# Patient Record
Sex: MALE | Race: WHITE | NOT HISPANIC OR LATINO | Employment: FULL TIME | URBAN - METROPOLITAN AREA
[De-identification: names, ages, dates, MRNs, and addresses within clinical notes are randomized per-mention and may not be internally consistent; named-entity substitution may affect disease eponyms.]

---

## 2017-01-03 RX ORDER — NADOLOL 20 MG/1
TABLET ORAL
Qty: 180 TABLET | Refills: 2 | Status: SHIPPED | OUTPATIENT
Start: 2017-01-03 | End: 2019-07-05 | Stop reason: SDUPTHER

## 2017-11-01 ENCOUNTER — OFFICE VISIT (OUTPATIENT)
Dept: CARDIOLOGY | Facility: CLINIC | Age: 52
End: 2017-11-01

## 2017-11-01 VITALS
SYSTOLIC BLOOD PRESSURE: 126 MMHG | WEIGHT: 187.2 LBS | BODY MASS INDEX: 27.73 KG/M2 | HEIGHT: 69 IN | DIASTOLIC BLOOD PRESSURE: 78 MMHG | HEART RATE: 68 BPM

## 2017-11-01 DIAGNOSIS — I34.1 MITRAL VALVE PROLAPSE SYNDROME: Primary | ICD-10-CM

## 2017-11-01 DIAGNOSIS — G93.32 CFS (CHRONIC FATIGUE SYNDROME): ICD-10-CM

## 2017-11-01 DIAGNOSIS — E78.5 ATHEROGENIC DYSLIPIDEMIA: ICD-10-CM

## 2017-11-01 PROCEDURE — 99214 OFFICE O/P EST MOD 30 MIN: CPT | Performed by: INTERNAL MEDICINE

## 2017-11-01 PROCEDURE — 90471 IMMUNIZATION ADMIN: CPT | Performed by: INTERNAL MEDICINE

## 2017-11-01 PROCEDURE — 90686 IIV4 VACC NO PRSV 0.5 ML IM: CPT | Performed by: INTERNAL MEDICINE

## 2017-11-01 RX ORDER — GEMFIBROZIL 600 MG/1
TABLET, FILM COATED ORAL
COMMUNITY
Start: 2017-10-30 | End: 2018-11-02
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
## 2018-01-12 RX ORDER — NADOLOL 20 MG/1
TABLET ORAL
Qty: 180 TABLET | Refills: 2 | Status: SHIPPED | OUTPATIENT
Start: 2018-01-12 | End: 2018-11-01 | Stop reason: SDUPTHER

## 2018-11-02 ENCOUNTER — OFFICE VISIT (OUTPATIENT)
Dept: CARDIOLOGY | Facility: CLINIC | Age: 53
End: 2018-11-02

## 2018-11-02 VITALS
DIASTOLIC BLOOD PRESSURE: 92 MMHG | SYSTOLIC BLOOD PRESSURE: 132 MMHG | HEART RATE: 59 BPM | BODY MASS INDEX: 27.28 KG/M2 | WEIGHT: 184.2 LBS | HEIGHT: 69 IN

## 2018-11-02 DIAGNOSIS — E78.5 ATHEROGENIC DYSLIPIDEMIA: ICD-10-CM

## 2018-11-02 DIAGNOSIS — I34.1 MITRAL VALVE PROLAPSE SYNDROME: Primary | ICD-10-CM

## 2018-11-02 PROCEDURE — 99213 OFFICE O/P EST LOW 20 MIN: CPT | Performed by: INTERNAL MEDICINE

## 2018-11-02 NOTE — PROGRESS NOTES
"    Subjective:     Encounter Date:11/02/2018    Patient ID: Yoni Conway is a 53 y.o.  white male, , from [place], [place].      PHYSICIAN: Cleopatra Sanchez MD.    Chief Complaint:   Chief Complaint   Patient presents with   • Mitral Valve Prolapse     Problem List:  1. Mitral valve prolapse syndrome:  a. Pectus excavatum with straight back syndrome.   b. Apical systolic murmur with holosystolic echocardiographic mitral valve prolapse.   c. Incomplete right bundle branch block.   d. Remote noncardiac atypical chest pain, resolved.   e. Chronic fatigue/dyspnea.   f. Acceptable echocardiographic left ventricular function, 2003.   g. Remote intermittent palpitations with overall acceptable 24 hour Holter monitor, October 2003, with acceptable event recorder, autumn 2003.   h. Abnormal acceptable combination Doppler echocardiogram with residual CCS Class I angina pectoris/CHF, August 2006.   i. Abnormal combination Doppler echocardiogram for suspected endocarditis ([facility]), August 2009.   j. Residual class I symptoms with abnormal stable combination Doppler echocardiogram/EKG, February 2011, with stable echocardiogram, November 2012.   k. Residual class I symptoms.  2. Mild-moderate situational depression.   3. Chronic fatigue syndrome -- (?) etiology.   4. Remote apparent otitis media/\"blood infection\", 1979.   5. Remote anal fistula resection/repair, 2002.   6. Atherogenic dyslipidemia; ASCVD 10 year risk is 5.5%, 2.4% if treated.     No Known Allergies      Current Outpatient Prescriptions:   •  FLUoxetine (PROzac) 20 MG capsule, 3 (Three) Times a Day., Disp: , Rfl:   •  nadolol (CORGARD) 20 MG tablet, 1 tab po every 12 hours, Disp: 180 tablet, Rfl: 2  •  QUEtiapine (SEROquel) 50 MG tablet, Daily., Disp: , Rfl:     HISTORY OF PRESENT ILLNESS:  Patient is here for a 1 year follow-up.  He denies any palpitations, presyncope, syncope, chest pain, or shortness of breath.  He is still working full " "time and does his work without difficulties.  He frequently goes fishing and hunts on the weekends.  He has already had his influenza vaccination this year.  He has not had any new diagnoses, hospitalizations, or surgeries since he was last here.  Patient otherwise denies chest pain, shortness of breath, PND, edema, palpitations, syncope or presyncope at this time.      Review of Systems   All other systems reviewed and are negative.     Obtained and otherwise negative except as outlined in problem list and HPI.    Procedures       Objective:       Vitals:    11/02/18 1249 11/02/18 1251   BP: 131/88 132/92   BP Location: Left arm Left arm   Patient Position: Sitting Standing   Pulse: 58 59   Weight: 83.6 kg (184 lb 3.2 oz)    Height: 175.3 cm (69\")      Body mass index is 27.2 kg/m².  Last weight November 2017 was 187 pounds    Physical Exam   Constitutional: He is oriented to person, place, and time. He appears well-developed and well-nourished.   HENT:   Mouth/Throat: Uvula is midline, oropharynx is clear and moist and mucous membranes are normal. He does not have dentures. No oral lesions. Normal dentition. No dental abscesses, uvula swelling, lacerations or dental caries.   Neck: No JVD present. Carotid bruit is not present. No thyromegaly present.   Cardiovascular: Regular rhythm, S1 normal and S2 normal.  Exam reveals no gallop, no S3 and no friction rub.    Murmur heard.   Medium-pitched early systolic murmur is present with a grade of 1/6  at the upper left sternal border  Pulses:       Dorsalis pedis pulses are 2+ on the right side, and 2+ on the left side.        Posterior tibial pulses are 2+ on the right side, and 2+ on the left side.   Pulmonary/Chest: Effort normal and breath sounds normal. He has no wheezes. He has no rhonchi. He has no rales.   Abdominal: Soft. He exhibits no mass. There is no hepatosplenomegaly. There is no tenderness. There is no guarding.   Bowel sounds audible x4 "   Musculoskeletal: Normal range of motion. He exhibits no edema.   Lymphadenopathy:     He has no cervical adenopathy.   Neurological: He is alert and oriented to person, place, and time.   Skin: Skin is warm, dry and intact. No rash noted.   Vitals reviewed.        Lab Review:   3/7/18: (Reviewed per physician letter):  · Lipid panel: Cholesterol 170, triglycerides 130, HDL 34,   · CMP: Normal electrolytes, creatinine 1.2, BUN 12, glucose 107, normal serum calcium and liver function tests, albumin 4.2      Assessment:   Overall continued acceptable course with no interim cardiopulmonary complaints with good functional status. We will defer additional diagnostic or therapeutic intervention from a cardiac perspective at this time.  His ASCVD 10 year risk is 5.5%, 2.4% if treated so we will defer statin therapy.     Diagnosis Plan   1. Mitral valve prolapse syndrome  Stable; No recurrent angina pectoris or CHF; continue current treatment     2. Atherogenic dyslipidemia  Acceptable          Plan:         1. Patient to continue current medications and close follow up with the above providers.  2. Tentative cardiology follow up in November 2019, or patient may return sooner PRN.    Scribed for Rashard Tate MD by Diann Daigle, [place]. 11/2/2018  1:05 PM    I, Rashard Tate MD, [facility], personally performed the services described in this documentation as scribed by the above named individual in my presence, and it is both accurate and complete. At 1:38 PM on 11/02/2018

## 2019-07-05 RX ORDER — NADOLOL 20 MG/1
TABLET ORAL
Qty: 180 TABLET | Refills: 3 | Status: SHIPPED | OUTPATIENT
Start: 2019-07-05 | End: 2019-11-08 | Stop reason: SDUPTHER

## 2019-11-08 ENCOUNTER — OFFICE VISIT (OUTPATIENT)
Dept: CARDIOLOGY | Facility: CLINIC | Age: 54
End: 2019-11-08

## 2019-11-08 VITALS
SYSTOLIC BLOOD PRESSURE: 134 MMHG | WEIGHT: 180 LBS | DIASTOLIC BLOOD PRESSURE: 75 MMHG | HEART RATE: 55 BPM | HEIGHT: 69 IN | BODY MASS INDEX: 26.66 KG/M2

## 2019-11-08 DIAGNOSIS — I34.1 MITRAL VALVE PROLAPSE SYNDROME: Primary | ICD-10-CM

## 2019-11-08 DIAGNOSIS — E78.5 ATHEROGENIC DYSLIPIDEMIA: ICD-10-CM

## 2019-11-08 PROCEDURE — 99213 OFFICE O/P EST LOW 20 MIN: CPT | Performed by: INTERNAL MEDICINE

## 2019-11-08 RX ORDER — NADOLOL 20 MG/1
TABLET ORAL
Qty: 90 TABLET | Refills: 3 | Status: SHIPPED | OUTPATIENT
Start: 2019-11-08 | End: 2020-07-13

## 2019-11-08 NOTE — PROGRESS NOTES
"    Subjective:     Encounter Date:11/08/2019    Patient ID: Yoni Conway is a 54 y.o.  white male, , from [place], [place].      PHYSICIAN: Cleopatra Sanchez MD    Chief Complaint:   Chief Complaint   Patient presents with   • Mitral Valve Prolapse     Problem List:  1. Mitral valve prolapse syndrome:  a. Pectus excavatum with straight back syndrome.   b. Apical systolic murmur with holosystolic echocardiographic mitral valve prolapse.   c. Incomplete right bundle branch block.   d. Remote noncardiac atypical chest pain, resolved.   e. Chronic fatigue/dyspnea.   f. Acceptable echocardiographic left ventricular function, 2003.   g. Remote intermittent palpitations with overall acceptable 24 hour Holter monitor, October 2003, with acceptable event recorder, autumn 2003.   h. Abnormal acceptable combination Doppler echocardiogram with residual CCS Class I angina pectoris/CHF, August 2006.   i. Abnormal combination Doppler echocardiogram for suspected endocarditis ([facility]), August 2009.   j. Residual class I symptoms with abnormal stable combination Doppler echocardiogram/EKG, February 2011, with stable echocardiogram, November 2012.   k. Residual class I symptoms.  2. Mild-moderate situational depression.   3. Chronic fatigue syndrome -- (?) etiology.   4. Remote apparent otitis media/\"blood infection\", 1979.   5. Remote anal fistula resection/repair, 2002.   6. Atherogenic dyslipidemia; ASCVD 10 year risk is 5.5%, 2.4% if treated.     No Known Allergies      Current Outpatient Medications:   •  FLUoxetine (PROzac) 20 MG capsule, 3 (Three) Times a Day., Disp: , Rfl:   •  nadolol (CORGARD) 20 MG tablet, TAKE 1 TABLET EVERY 12 HOURS (Patient taking differently: Daily.), Disp: 180 tablet, Rfl: 3  •  QUEtiapine (SEROquel) 50 MG tablet, Daily., Disp: , Rfl:     HISTORY OF PRESENT ILLNESS: Patient returns for scheduled annual followup. He has done well since last being seen in our office.  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  He likes the longer shifts and having an extra day off.  He has no chest pain, tightness, or pressure and no shortness of breath with his activity.  He does not walk much on the job because he stays in the same place most of the time.  He tries to walk some on the weekend and is looking forward to hunting this season.  He has had no ER visits, hospitalizations, surgeries, or serious illnesses in the interim.  After his lab studies in July 2019, his physician advised him to continue taking fish oil.  He notes that his last lab results were delayed in getting to us because his physician's office apparently did not have the correct fax number; we will provide him with a card today that has the fax number printed on it.  Patient otherwise denies chest pain, shortness of breath, PND, edema, palpitations, syncope or presyncope at this time.        Review of Systems   HENT: Positive for tinnitus.    Eyes: Positive for visual disturbance (seeing floaters/spots).   Skin: Positive for dry skin and rash.   Neurological: Positive for excessive daytime sleepiness.   Psychiatric/Behavioral: The patient is nervous/anxious.       All other systems reviewed and otherwise negative.    Procedures       Objective:       Vitals:    11/08/19 1257 11/08/19 1258   BP: 130/90 134/75   BP Location: Right arm Right arm   Patient Position: Sitting Standing   Pulse: 51 55   Weight: 81.6 kg (180 lb)    Height: 175.3 cm (69\")      Body mass index is 26.58 kg/m².   Last weight:  184 lbs.    Physical Exam   Constitutional: He is oriented to person, place, and time. He appears well-developed and well-nourished.   HENT:   Mouth/Throat: Uvula is midline, oropharynx is clear and moist and mucous membranes are normal. He does not have dentures. No oral lesions. Normal dentition. No dental abscesses, uvula swelling, " lacerations or dental caries.   Neck: No JVD present. Carotid bruit is not present. No thyromegaly present.   Cardiovascular: Regular rhythm, S1 normal and S2 normal. Exam reveals no gallop, no S3 and no friction rub.   Murmur heard.   Medium-pitched early systolic murmur is present with a grade of 1/6 at the lower left sternal border.  Pulses:       Dorsalis pedis pulses are 2+ on the right side, and 2+ on the left side.        Posterior tibial pulses are 2+ on the right side, and 2+ on the left side.   Pulmonary/Chest: Effort normal and breath sounds normal. He has no wheezes. He has no rhonchi. He has no rales.   Abdominal: Soft. He exhibits no mass. There is no hepatosplenomegaly. There is no tenderness. There is no guarding.   Bowel sounds audible x4   Musculoskeletal: Normal range of motion. He exhibits no edema.   Lymphadenopathy:     He has no cervical adenopathy.   Neurological: He is alert and oriented to person, place, and time.   Skin: Skin is warm, dry and intact. No rash noted.   Vitals reviewed.        Lab Review: 07/25/2019  (reviewed with patient by letter - no changes made):  · FLP - total cholesterol 182, LDL-C 113, HDL-C 29, triglycerides 278, non-HDL-C 153  · CMP - normal electrolytes, serum creatinine 1, BUN 13, glucose 103 mg/dL, normal serum calcium and liver function tests, serum albumin 3.8          Assessment:   Overall continued acceptable course with no new interim cardiopulmonary complaints with good functional status. We will defer additional diagnostic or therapeutic intervention from a cardiac perspective at this time.  Hopefully, we will be allowed to review any laboratory studies he has drawn in the interim.  His overall risk remains low, and we will defer statin drug therapy at this time.  He is strongly encouraged to limit his carbohydrate intake and attempt to exercise on a daily basis to assist in controlling his hypertriglyceridemia.       Diagnosis Plan   1. Mitral valve  prolapse syndrome  No recurrent angina pectoris or CHF on current activity schedule with stable examination; continue current treatment     2. Atherogenic dyslipidemia  Suboptimal recent values; would intensify activity and limit carbohydrate intake          Plan:         1. Patient to continue current medications and close follow up with the above providers.  2. Tentative cardiology follow up in November 2020, or patient may return sooner PRN.    Transcribed by Valencia Nguyen for Dr. Rashard Tate at 1:07 PM on 11/08/2019    IRashard MD, [facility], personally performed the services described in this documentation as scribed by the above named individual in my presence, and it is both accurate and complete. At 1:20 PM on 11/08/2019

## 2020-07-13 RX ORDER — NADOLOL 20 MG/1
TABLET ORAL
Qty: 180 TABLET | Refills: 3 | Status: SHIPPED | OUTPATIENT
Start: 2020-07-13 | End: 2022-01-10

## 2020-11-12 NOTE — PROGRESS NOTES
"    Subjective:     Encounter Date:11/13/2020    Patient ID: Yoni Conway is a 55 y.o.  white male, , from [place], [place].      PHYSICIAN: Cleopatra Sanchez MD    Chief Complaint:   Chief Complaint   Patient presents with   • Mitral valve prolapse syndrome       Problem List:  1. Mitral valve prolapse syndrome:  a. Pectus excavatum with straight back syndrome.   b. Apical systolic murmur with holosystolic echocardiographic mitral valve prolapse.   c. Incomplete right bundle branch block.   d. Remote noncardiac atypical chest pain, resolved.   e. Chronic fatigue/dyspnea.   f. Acceptable echocardiographic left ventricular function, 2003.   g. Remote intermittent palpitations with overall acceptable 24 hour Holter monitor, October 2003, with acceptable event recorder, autumn 2003.   h. Abnormal acceptable combination Doppler echocardiogram with residual CCS Class I angina pectoris/CHF, August 2006.   i. Abnormal combination Doppler echocardiogram for suspected endocarditis ([facility]), August 2009.   j. Residual class I symptoms with abnormal stable combination Doppler echocardiogram/EKG, February 2011, with stable echocardiogram, November 2012.   k. Residual class I symptoms.  2. Mild-moderate situational depression.   3. Chronic fatigue syndrome -- (?) etiology.   4. Remote apparent otitis media/\"blood infection\", 1979.   5. Remote anal fistula resection/repair, 2002.   6. Atherogenic dyslipidemia; ASCVD 10 year risk is 5.5%, 2.4% if treated.   7. 5-day hospitalization at psychiatric hospital for major depression December 2019  8. COVID-19 July 2020  9. Intermittent palpitations, November 2020    No Known Allergies    Current Outpatient Medications:   •  FLUoxetine (PROzac) 20 MG capsule, 3 (Three) Times a Day., Disp: , Rfl:   •  LORazepam (ATIVAN) 0.5 MG tablet, Take 0.5 mg by mouth As Needed for Anxiety., Disp: , Rfl:   •  mirtazapine (REMERON) 15 MG tablet, Take 15 mg by mouth Every Night., " "Disp: , Rfl:   •  nadolol (CORGARD) 20 MG tablet, TAKE 1 TABLET EVERY 12 HOURS (Patient taking differently: Take 20 mg by mouth Daily.), Disp: 180 tablet, Rfl: 3    History of Present Illness: Patient returns for scheduled annual follow up.  The patient had a 5-day hospitalization in a psychiatric hospital for major depression December 2019.  He denied any suicidal ideations.  The patient had an episode summer 2020 at work when he had some chest pain and saw his primary care physician who felt that it was musculoskeletal in nature.  He also had COVID-19 in July 2020.  He had fatigue around that time but no shortness of breath or loss of taste or smell.  The patient has noticed over the past few weeks that he has noted increased palpitations.  He denies any chest pressure, dizziness, presyncope or syncope.  He has not had any other hospitalizations or surgeries since he was last seen.         Review of Systems   Cardiovascular: Positive for palpitations.      Obtained and negative except as outlined in problem list and HPI.      ECG 12 Lead    Date/Time: 11/13/2020 11:46 AM  Performed by: Rashard Tate MD  Authorized by: Rashard Tate MD   Rhythm comments: Sinus bradycardia, T wave abnormality, consider inferior ischemia, abnormal ECG, 56 bpm, QRS 84 ms,  ms,  ms, no prior ECGs to review                 Objective:       Vitals:    11/13/20 1057 11/13/20 1100   BP: 138/87 124/78   BP Location: Right arm Right arm   Patient Position: Standing Standing   Pulse: 61 61   SpO2: 99%    Weight: 84 kg (185 lb 3.2 oz)    Height: 175.3 cm (69\")    Recheck blood pressure right arm sitting was 130/80  Body mass index is 27.35 kg/m².  Last weight: 180 lbs    Vitals signs reviewed.   Constitutional:       Appearance: Well-developed.   Neck:      Thyroid: No thyromegaly.      Vascular: No carotid bruit or JVD.      Lymphadenopathy: No cervical adenopathy.   Pulmonary:      Effort: Pulmonary effort is normal.      " Breath sounds: Normal breath sounds. No wheezing. No rhonchi. No rales.   Cardiovascular:      Regular rhythm.      Murmurs: There is a grade 1/6 mid frequency harsh early systolic murmur at the LLSB.      No gallop. No S3 gallop.   Pulses:     Dorsalis pedis: 2+ bilaterally.     Posterior tibial: 2+ bilaterally.  Edema:     Peripheral edema absent.   Abdominal:      Palpations: Abdomen is soft. There is no abdominal mass.      Tenderness: There is no abdominal tenderness. There is no guarding.   Musculoskeletal: Normal range of motion.   Skin:     General: Skin is warm and dry.      Findings: No rash.   Neurological:      Mental Status: Alert and oriented to person, place, and time.           Lab Review:     08/26/2020 (reviewed with patient by letter):  · CBC: hematocrit 44, hemoglobin 15.5, WBC 8100 and normal indices, platelet count and differential  • CMP: normal electrolytes with acceptable kidney function, serum creatinine 1.2, BUN 13 and normal serum calcium and liver function tests.   • PSA- normal  • Hemoglobin A1c- 5.8%            Assessment:       Overall continued acceptable course with new interim cardiopulmonary complaints with increased palpitations and acceptable functional status. He had an abnormal electrocardiogram in office today showing inferior T wave abnormalities; will order a stress echocardiogram to screen for obstructive coronary disease as well as reassess mitral valve function and LV function.  He has had increased palpitations over the past few weeks; he will wear an E patch to assess for arrhythmias, PAF, or pauses.     Diagnosis Plan   1. Mitral valve prolapse syndrome  No recurrent angina pectoris or CHF on current activity schedule; continue current treatment, stress echocardiogram   2. Atherogenic dyslipidemia  No new data to review; continue activity and reduced carbohydrate intake   3.  Intermittent palpitations: E patch  4.  Abnormal ECG: Stress echocardiogram       Plan:          1. Patient to continue current medications and close follow up with the above providers.  2. Tentative cardiology follow up in November 2021 or patient may return sooner PRN.  3. The following studies have been ordered:  A. Stress echocardiogram  B. E patch     Scribed for Rashard Tate MD by Diann Daigle, APRN. 11/13/2020  11:53 EST    I, Rashard Tate MD, [facility], personally performed the services described in this documentation as scribed by the above named individual in my presence, and it is both accurate and complete. At 11:56 EST on 11/13/2020

## 2020-11-13 ENCOUNTER — OFFICE VISIT (OUTPATIENT)
Dept: CARDIOLOGY | Facility: CLINIC | Age: 55
End: 2020-11-13

## 2020-11-13 VITALS
WEIGHT: 185.2 LBS | HEART RATE: 61 BPM | OXYGEN SATURATION: 99 % | HEIGHT: 69 IN | BODY MASS INDEX: 27.43 KG/M2 | DIASTOLIC BLOOD PRESSURE: 78 MMHG | SYSTOLIC BLOOD PRESSURE: 124 MMHG

## 2020-11-13 DIAGNOSIS — I34.1 MITRAL VALVE PROLAPSE SYNDROME: Primary | ICD-10-CM

## 2020-11-13 DIAGNOSIS — E78.5 ATHEROGENIC DYSLIPIDEMIA: ICD-10-CM

## 2020-11-13 DIAGNOSIS — R00.2 INTERMITTENT PALPITATIONS: ICD-10-CM

## 2020-11-13 DIAGNOSIS — R94.31 ABNORMAL EKG: ICD-10-CM

## 2020-11-13 PROCEDURE — 93000 ELECTROCARDIOGRAM COMPLETE: CPT | Performed by: INTERNAL MEDICINE

## 2020-11-13 PROCEDURE — 99214 OFFICE O/P EST MOD 30 MIN: CPT | Performed by: INTERNAL MEDICINE

## 2020-11-13 RX ORDER — MIRTAZAPINE 15 MG/1
15 TABLET, FILM COATED ORAL NIGHTLY
COMMUNITY
End: 2023-02-24 | Stop reason: SDDI

## 2020-11-13 RX ORDER — LORAZEPAM 0.5 MG/1
0.5 TABLET ORAL AS NEEDED
COMMUNITY
End: 2022-02-23

## 2020-12-10 ENCOUNTER — TRANSCRIBE ORDERS (OUTPATIENT)
Dept: ADMINISTRATIVE | Facility: HOSPITAL | Age: 55
End: 2020-12-10

## 2020-12-10 DIAGNOSIS — Z01.818 PRE-OPERATIVE CLEARANCE: Primary | ICD-10-CM

## 2020-12-14 ENCOUNTER — LAB (OUTPATIENT)
Dept: LAB | Facility: HOSPITAL | Age: 55
End: 2020-12-14

## 2020-12-14 DIAGNOSIS — Z01.818 PRE-OPERATIVE CLEARANCE: ICD-10-CM

## 2020-12-14 LAB — SARS-COV-2 RNA RESP QL NAA+PROBE: NOT DETECTED

## 2020-12-14 PROCEDURE — U0004 COV-19 TEST NON-CDC HGH THRU: HCPCS | Performed by: GENERAL PRACTICE

## 2020-12-16 ENCOUNTER — APPOINTMENT (OUTPATIENT)
Dept: CARDIOLOGY | Facility: HOSPITAL | Age: 55
End: 2020-12-16

## 2021-01-07 ENCOUNTER — TRANSCRIBE ORDERS (OUTPATIENT)
Dept: ADMINISTRATIVE | Facility: HOSPITAL | Age: 56
End: 2021-01-07

## 2021-01-07 DIAGNOSIS — Z01.818 PRE-OPERATIVE CLEARANCE: Primary | ICD-10-CM

## 2021-01-15 ENCOUNTER — LAB (OUTPATIENT)
Dept: LAB | Facility: HOSPITAL | Age: 56
End: 2021-01-15

## 2021-01-15 DIAGNOSIS — Z01.818 PRE-OPERATIVE CLEARANCE: ICD-10-CM

## 2021-01-15 PROCEDURE — C9803 HOPD COVID-19 SPEC COLLECT: HCPCS

## 2021-01-15 PROCEDURE — U0004 COV-19 TEST NON-CDC HGH THRU: HCPCS | Performed by: INTERNAL MEDICINE

## 2021-01-16 LAB — SARS-COV-2 RNA RESP QL NAA+PROBE: NOT DETECTED

## 2021-01-18 ENCOUNTER — HOSPITAL ENCOUNTER (OUTPATIENT)
Dept: CARDIOLOGY | Facility: HOSPITAL | Age: 56
Discharge: HOME | End: 2021-01-18
Admitting: NURSE PRACTITIONER

## 2021-01-18 VITALS
HEART RATE: 79 BPM | HEIGHT: 69 IN | DIASTOLIC BLOOD PRESSURE: 100 MMHG | RESPIRATION RATE: 18 BRPM | SYSTOLIC BLOOD PRESSURE: 146 MMHG | BODY MASS INDEX: 27.4 KG/M2 | WEIGHT: 185 LBS

## 2021-01-18 DIAGNOSIS — R00.2 INTERMITTENT PALPITATIONS: ICD-10-CM

## 2021-01-18 DIAGNOSIS — I34.1 MITRAL VALVE PROLAPSE SYNDROME: ICD-10-CM

## 2021-01-18 DIAGNOSIS — R94.31 ABNORMAL EKG: ICD-10-CM

## 2021-01-18 LAB
BH CV ECHO MEAS - BSA(HAYCOCK): 2 M^2
BH CV ECHO MEAS - BSA: 2 M^2
BH CV ECHO MEAS - BZI_BMI: 27.4 KILOGRAMS/M^2
BH CV ECHO MEAS - BZI_METRIC_HEIGHT: 175 CM
BH CV ECHO MEAS - BZI_METRIC_WEIGHT: 83.9 KG
BH CV ECHO MEAS - EDV(CUBED): 79.5 ML
BH CV ECHO MEAS - EDV(MOD-SP2): 103 ML
BH CV ECHO MEAS - EDV(MOD-SP4): 131 ML
BH CV ECHO MEAS - EDV(TEICH): 83.1 ML
BH CV ECHO MEAS - EF(CUBED): 80.3 %
BH CV ECHO MEAS - EF(MOD-SP2): 42.6 %
BH CV ECHO MEAS - EF(MOD-SP4): 54.7 %
BH CV ECHO MEAS - EF(TEICH): 73.1 %
BH CV ECHO MEAS - ESV(CUBED): 15.6 ML
BH CV ECHO MEAS - ESV(MOD-SP2): 59.1 ML
BH CV ECHO MEAS - ESV(MOD-SP4): 59.3 ML
BH CV ECHO MEAS - ESV(TEICH): 22.3 ML
BH CV ECHO MEAS - FS: 41.9 %
BH CV ECHO MEAS - IVS/LVPW: 1.1
BH CV ECHO MEAS - IVSD: 0.9 CM
BH CV ECHO MEAS - LA DIMENSION: 3.6 CM
BH CV ECHO MEAS - LV DIASTOLIC VOL/BSA (35-75): 65.6 ML/M^2
BH CV ECHO MEAS - LV MASS(C)D: 114.2 GRAMS
BH CV ECHO MEAS - LV MASS(C)DI: 57.2 GRAMS/M^2
BH CV ECHO MEAS - LV MAX PG: 2.5 MMHG
BH CV ECHO MEAS - LV MEAN PG: 1 MMHG
BH CV ECHO MEAS - LV SYSTOLIC VOL/BSA (12-30): 29.7 ML/M^2
BH CV ECHO MEAS - LV V1 MAX: 79.6 CM/SEC
BH CV ECHO MEAS - LV V1 MEAN: 44.6 CM/SEC
BH CV ECHO MEAS - LV V1 VTI: 16.7 CM
BH CV ECHO MEAS - LVIDD: 4.3 CM
BH CV ECHO MEAS - LVIDS: 2.5 CM
BH CV ECHO MEAS - LVLD AP2: 9 CM
BH CV ECHO MEAS - LVLD AP4: 8.3 CM
BH CV ECHO MEAS - LVLS AP2: 7.8 CM
BH CV ECHO MEAS - LVLS AP4: 8 CM
BH CV ECHO MEAS - LVPWD: 0.8 CM
BH CV ECHO MEAS - MV A MAX VEL: 73.7 CM/SEC
BH CV ECHO MEAS - MV DEC SLOPE: 286.5 CM/SEC^2
BH CV ECHO MEAS - MV DEC TIME: 0.23 SEC
BH CV ECHO MEAS - MV E MAX VEL: 53.1 CM/SEC
BH CV ECHO MEAS - MV E/A: 0.72
BH CV ECHO MEAS - MV P1/2T MAX VEL: 78.7 CM/SEC
BH CV ECHO MEAS - MV P1/2T: 80.5 MSEC
BH CV ECHO MEAS - MVA P1/2T LCG: 2.8 CM^2
BH CV ECHO MEAS - MVA(P1/2T): 2.7 CM^2
BH CV ECHO MEAS - PA ACC TIME: 0.13 SEC
BH CV ECHO MEAS - PA PR(ACCEL): 21.9 MMHG
BH CV ECHO MEAS - PI END-D VEL: 140 CM/SEC
BH CV ECHO MEAS - RAP SYSTOLE: 8 MMHG
BH CV ECHO MEAS - RVSP: 27 MMHG
BH CV ECHO MEAS - SI(CUBED): 32 ML/M^2
BH CV ECHO MEAS - SI(MOD-SP2): 22 ML/M^2
BH CV ECHO MEAS - SI(MOD-SP4): 35.9 ML/M^2
BH CV ECHO MEAS - SI(TEICH): 30.4 ML/M^2
BH CV ECHO MEAS - SV(CUBED): 63.9 ML
BH CV ECHO MEAS - SV(MOD-SP2): 43.9 ML
BH CV ECHO MEAS - SV(MOD-SP4): 71.7 ML
BH CV ECHO MEAS - SV(TEICH): 60.7 ML
BH CV ECHO MEAS - TR MAX PG: 19 MMHG
BH CV ECHO MEAS - TR MAX VEL: 215.5 CM/SEC
BH CV STRESS BP STAGE 1: NORMAL
BH CV STRESS BP STAGE 2: NORMAL
BH CV STRESS BP STAGE 3: NORMAL
BH CV STRESS DURATION MIN STAGE 1: 3
BH CV STRESS DURATION MIN STAGE 2: 3
BH CV STRESS DURATION MIN STAGE 3: 3
BH CV STRESS DURATION MIN STAGE 4: 0
BH CV STRESS DURATION SEC STAGE 1: 0
BH CV STRESS DURATION SEC STAGE 2: 0
BH CV STRESS DURATION SEC STAGE 3: 0
BH CV STRESS DURATION SEC STAGE 4: 18
BH CV STRESS ECHO POST STRESS EJECTION FRACTION EF: 72 %
BH CV STRESS GRADE STAGE 1: 10
BH CV STRESS GRADE STAGE 2: 12
BH CV STRESS GRADE STAGE 3: 14
BH CV STRESS GRADE STAGE 4: 16
BH CV STRESS HR STAGE 1: 110
BH CV STRESS HR STAGE 2: 133
BH CV STRESS HR STAGE 3: 146
BH CV STRESS HR STAGE 4: 150
BH CV STRESS METS STAGE 1: 5
BH CV STRESS METS STAGE 2: 7.5
BH CV STRESS METS STAGE 3: 10
BH CV STRESS METS STAGE 4: 13.5
BH CV STRESS O2 STAGE 1: 97
BH CV STRESS O2 STAGE 2: 96
BH CV STRESS O2 STAGE 3: 95
BH CV STRESS PROTOCOL 1: NORMAL
BH CV STRESS RECOVERY BP: NORMAL MMHG
BH CV STRESS RECOVERY HR: 90 BPM
BH CV STRESS RECOVERY O2: 99 %
BH CV STRESS SPEED STAGE 1: 1.7
BH CV STRESS SPEED STAGE 2: 2.5
BH CV STRESS SPEED STAGE 3: 3.4
BH CV STRESS SPEED STAGE 4: 4.2
BH CV STRESS STAGE 1: 1
BH CV STRESS STAGE 2: 2
BH CV STRESS STAGE 3: 3
BH CV STRESS STAGE 4: 4
BH CV VAS BP LEFT ARM: NORMAL MMHG
LV EF 2D ECHO EST: 65 %
MAXIMAL PREDICTED HEART RATE: 167 BPM
PERCENT MAX PREDICTED HR: 90.42 %
STRESS BASELINE BP: NORMAL MMHG
STRESS BASELINE HR: 75 BPM
STRESS O2 SAT REST: 99 %
STRESS PERCENT HR: 106 %
STRESS POST ESTIMATED WORKLOAD: 11 METS
STRESS POST EXERCISE DUR MIN: 9 MIN
STRESS POST EXERCISE DUR SEC: 18 SEC
STRESS POST O2 SAT PEAK: 95 %
STRESS POST PEAK BP: NORMAL MMHG
STRESS POST PEAK HR: 151 BPM
STRESS TARGET HR: 142 BPM

## 2021-01-18 PROCEDURE — 25010000002 SULFUR HEXAFLUORIDE MICROSPH 60.7-25 MG RECONSTITUTED SUSPENSION: Performed by: NURSE PRACTITIONER

## 2021-01-18 PROCEDURE — 93320 DOPPLER ECHO COMPLETE: CPT

## 2021-01-18 PROCEDURE — 93352 ADMIN ECG CONTRAST AGENT: CPT | Performed by: INTERNAL MEDICINE

## 2021-01-18 PROCEDURE — 93350 STRESS TTE ONLY: CPT

## 2021-01-18 PROCEDURE — 93325 DOPPLER ECHO COLOR FLOW MAPG: CPT

## 2021-01-18 PROCEDURE — 93018 CV STRESS TEST I&R ONLY: CPT | Performed by: INTERNAL MEDICINE

## 2021-01-18 PROCEDURE — 93017 CV STRESS TEST TRACING ONLY: CPT

## 2021-01-18 PROCEDURE — 93350 STRESS TTE ONLY: CPT | Performed by: INTERNAL MEDICINE

## 2021-01-18 RX ADMIN — SULFUR HEXAFLUORIDE 5 ML: KIT at 14:35

## 2022-01-10 RX ORDER — NADOLOL 20 MG/1
20 TABLET ORAL DAILY
Qty: 60 TABLET | Refills: 0 | Status: SHIPPED | OUTPATIENT
Start: 2022-01-10 | End: 2022-02-23 | Stop reason: SDUPTHER

## 2022-02-23 ENCOUNTER — OFFICE VISIT (OUTPATIENT)
Dept: CARDIOLOGY | Facility: CLINIC | Age: 57
End: 2022-02-23

## 2022-02-23 VITALS
DIASTOLIC BLOOD PRESSURE: 72 MMHG | HEART RATE: 62 BPM | WEIGHT: 184 LBS | BODY MASS INDEX: 27.25 KG/M2 | SYSTOLIC BLOOD PRESSURE: 112 MMHG | OXYGEN SATURATION: 98 % | HEIGHT: 69 IN

## 2022-02-23 DIAGNOSIS — R94.31 ABNORMAL EKG: ICD-10-CM

## 2022-02-23 DIAGNOSIS — E78.5 ATHEROGENIC DYSLIPIDEMIA: ICD-10-CM

## 2022-02-23 DIAGNOSIS — I34.1 MITRAL VALVE PROLAPSE SYNDROME: Primary | ICD-10-CM

## 2022-02-23 DIAGNOSIS — R00.2 INTERMITTENT PALPITATIONS: ICD-10-CM

## 2022-02-23 PROCEDURE — 99214 OFFICE O/P EST MOD 30 MIN: CPT | Performed by: INTERNAL MEDICINE

## 2022-02-23 RX ORDER — FLUOXETINE HYDROCHLORIDE 40 MG/1
40 CAPSULE ORAL DAILY
COMMUNITY
Start: 2022-01-13

## 2022-02-23 RX ORDER — NADOLOL 20 MG/1
20 TABLET ORAL DAILY
Qty: 90 TABLET | Refills: 3 | Status: SHIPPED | OUTPATIENT
Start: 2022-02-23 | End: 2022-06-08 | Stop reason: SDUPTHER

## 2022-02-23 RX ORDER — NADOLOL 20 MG/1
20 TABLET ORAL DAILY
Qty: 90 TABLET | Refills: 3 | Status: SHIPPED | OUTPATIENT
Start: 2022-02-23 | End: 2022-02-23

## 2022-02-23 RX ORDER — MELATONIN 5 MG
5 TABLET ORAL NIGHTLY PRN
COMMUNITY
Start: 2021-12-15

## 2022-02-23 RX ORDER — LISINOPRIL 10 MG/1
10 TABLET ORAL DAILY
COMMUNITY
Start: 2022-01-14

## 2022-02-23 RX ORDER — ATORVASTATIN CALCIUM 20 MG/1
20 TABLET, FILM COATED ORAL DAILY
COMMUNITY
Start: 2022-01-28

## 2022-02-23 RX ORDER — HYDROCHLOROTHIAZIDE 12.5 MG/1
12.5 TABLET ORAL DAILY
COMMUNITY
Start: 2022-01-02 | End: 2022-02-23

## 2022-02-23 NOTE — PROGRESS NOTES
"    Subjective:     Encounter Date:02/23/2022    Patient ID: Yoni Conway is a 56 y.o.  white male, , from [place], [place].      PHYSICIAN: Cleopatra Sanchez MD    Chief Complaint:   Chief Complaint   Patient presents with   • Mitral valve prolapse syndrome       Problem List:  1. Mitral valve prolapse syndrome:  a. Pectus excavatum with straight back syndrome.   b. Apical systolic murmur with holosystolic echocardiographic mitral valve prolapse.   c. Incomplete right bundle branch block.   d. Remote noncardiac atypical chest pain, resolved.   e. Chronic fatigue/dyspnea.   f. Acceptable echocardiographic left ventricular function, 2003.   g. Remote intermittent palpitations with overall acceptable 24 hour Holter monitor, October 2003, with acceptable event recorder, autumn 2003.   h. Abnormal acceptable combination Doppler echocardiogram with residual CCS Class I angina pectoris/CHF, August 2006.   i. Abnormal combination Doppler echocardiogram for suspected endocarditis ([facility]), August 2009.   j. Residual class I symptoms with abnormal stable combination Doppler echocardiogram/EKG, February 2011, with stable echocardiogram, November 2012.   k. Residual class I symptoms, November 2020.  l. Acceptable negative echocardiographic exercise stress test without anginal type chest discomfort, ischemic electrocardiographic changes, or regional wall motion abnormality with preserved systolic left ventricular function following exercise to 95% predicted exercise capacity and 90% predicted maximum heart rate.  m.  Residual class I symptoms, February 2022  2. Mild-moderate situational depression.   3. Chronic fatigue syndrome -- (?) etiology.   4. Remote apparent otitis media/\"blood infection\", 1979.   5. Remote anal fistula resection/repair, 2002.   6. Atherogenic dyslipidemia; ASCVD 10 year risk is 5.5%, 2.4% if treated.   7. 5-day hospitalization at psychiatric hospital for major depression " December 2019  8. COVID-19 July 2020  9. Intermittent palpitations, November 2020, with acceptable event monitor, December 2020  10. Breakthrough COVID infection, January 2022, with mild symptoms of sinus congestion.  11. Recent diagnosis of hypertension with initiation of antihypertensive therapy, January 2022    No Known Allergies    Current Outpatient Medications:   •  atorvastatin (LIPITOR) 20 MG tablet, Take 20 mg by mouth Daily., Disp: , Rfl:   •  FLUoxetine (PROzac) 20 MG capsule, Take 20 mg by mouth Daily., Disp: , Rfl:   •  FLUoxetine (PROzac) 40 MG capsule, Take 40 mg by mouth Daily., Disp: , Rfl:   •  lisinopril (PRINIVIL,ZESTRIL) 10 MG tablet, Take 10 mg by mouth Daily., Disp: , Rfl:   •  mirtazapine (REMERON) 15 MG tablet, Take 15 mg by mouth Every Night., Disp: , Rfl:   •  nadolol (CORGARD) 20 MG tablet, Take 1 tablet by mouth Daily., Disp: 60 tablet, Rfl: 0  •  SV Melatonin 5 MG tablet tablet, Take 5 mg by mouth At Night As Needed., Disp: , Rfl:     History of Present Illness: Patient returns after 15-month hiatus for follow up. He was infected with COVID in January 2022 and reports he had elevated blood pressure at that time and he was started on lisinopril; data deficit. He had interim laboratory studies and reports his triglycerides were significantly elevated. We have not obtained these results for review. He was started on atorvastatin. He has otherwise felt well overall from a cardiovascular standpoint. He has had occasional lightheadedness and his physician has ordered a carotid duplex. He has not had any focal neurologic deficits. Patient denies chest pain, shortness of breath, palpitations, edema, dizziness, and syncope. He is usually more active in the warmer months. He has had no additional interim ER visits, hospitalizations, serious illnesses, or surgeries. He has received COVID immunization.      ROS   Obtained and negative except as outlined in problem list and HPI.    Procedures      "  Objective:       Vitals:    02/23/22 1402 02/23/22 1406 02/23/22 1441   BP: 136/80 151/88 112/72   BP Location: Left arm Left arm Right arm   Patient Position: Sitting Standing Sitting   Pulse: 61 62    SpO2: 98%     Weight: 83.5 kg (184 lb)     Height: 175.3 cm (69\")       Body mass index is 27.17 kg/m².  Last weight: 185 lbs    Vitals reviewed.   Constitutional:       Appearance: Well-developed.   Neck:      Thyroid: No thyromegaly.      Vascular: No carotid bruit or JVD.      Lymphadenopathy: No cervical adenopathy.   Pulmonary:      Effort: Pulmonary effort is normal.      Breath sounds: Normal breath sounds. No wheezing. No rhonchi. No rales.   Cardiovascular:      Regular rhythm.      Murmurs: There is a grade 1/6 high frequency blowing holosystolic murmur at the apex.      No gallop. No S3 gallop.   Pulses:     Dorsalis pedis: 2+ bilaterally.     Posterior tibial: 2+ bilaterally.  Edema:     Peripheral edema absent.   Abdominal:      Palpations: Abdomen is soft. There is no abdominal mass.      Tenderness: There is no abdominal tenderness.   Musculoskeletal: Normal range of motion. Skin:     General: Skin is warm and dry.      Findings: No rash.   Neurological:      Mental Status: Alert and oriented to person, place, and time.           Lab Review:     No recent laboratory studies available for review today.    Event Monitor, 12/4/2020 (reviewed with patient by letter - study does not mandate formal antiarrhythmic therapy, device implant, or electrophysiologic study):  Heart rate ranged from /minute with an average of 64/minute. No atrial fibrillation/flutter, heart block, pause, or ventricular tachycardia. 0.1% atrial ectopy and 0.8% ventricular ectopy. 1 episode of SVT.     Stress Echocardiogram, 1/18/2021 (reviewed with patient by letter - continue current treatment, defer LHC, keep BP/glucose/lipids controlled):  · Patient denied chest discomfort/pain during exercise; did report some dyspnea with " WNL room air oximetry before, during, and after exercise (95-99%).  · Expected exercise duration = 10:00 minutes; actual exercise duration = 9:18 minutes; DAVID (+5).  · THR of 141/minute achieved at 8:04 minutes.  · No significant ST changes identified with occasional isolated ventricular ectopy present.  · Estimated left ventricular EF = 65% Left ventricular ejection fraction appears to be 61 - 65%. Left ventricular systolic function is normal.  · Left ventricular diastolic function was normal.  · Estimated right ventricular systolic pressure from tricuspid regurgitation is normal (<35 mmHg).  · Normal right ventricular cavity size, wall thickness, systolic function and septal motion noted.  · No evidence of pulmonary hypertension is present.  · There is no evidence of pericardial effusion.  · No significant structural or functional valvular abnormalities demonstrated; there are no definitive findings to suggest mitral valve prolapse or mitral annular disjunction.  · Acceptable negative echocardiographic exercise stress test without anginal type chest discomfort, ischemic electrocardiographic changes, or regional wall motion abnormality with preserved systolic left ventricular function following exercise to 95% predicted exercise capacity and 90% predicted maximum heart rate.  · Overall, current study suggests low probability for significant focal obstructive coronary artery disease.        Assessment:       Overall continued acceptable course with no new interim cardiopulmonary complaints with acceptable functional status. We will defer additional diagnostic or therapeutic intervention from a cardiac perspective at this time. Hopefully we will be able to obtain his recent laboratory study results for review with the patient by letter. He should monitor his blood pressure at home and keep a log of his readings.     Diagnosis Plan   1. Mitral valve prolapse syndrome  Stable examination.   2. Atherogenic dyslipidemia   No data to review. Continue atorvastatin and heart healthy diet.   3. Intermittent palpitations  Stable and largely asymptomatic.   4. Abnormal EKG  No new data to review.          Plan:         1. Patient to continue current medications and close follow up with the above providers.  2. Tentative cardiology follow up in February 2023 or patient may return sooner PRN.    I, Mikhail Castro, attest that this documentation has been prepared under the direction and in the presence of Rashard Tate MD 02/23/2022    I, Rashard Tate MD, [facility], personally performed the services described in this documentation as scribed by the above named individual in my presence, and it is both accurate and complete. At 14:41 EST on 02/23/2022

## 2022-06-07 ENCOUNTER — TELEPHONE (OUTPATIENT)
Dept: CARDIOLOGY | Facility: CLINIC | Age: 57
End: 2022-06-07

## 2022-06-07 NOTE — TELEPHONE ENCOUNTER
Patient says that for the past couple of months, when he bends over or squats down, he feels dizzy when he stands back up. Dizziness recovers after 15-20 seconds. Symptoms started when PCP started lisinopril-HCTZ    BP runs 120s/70s sitting. Patient has never checked BP while standing. HR70s    Pt has lost 8-10 pounds in last couple of months. Pt states that he drinks 2 16 ounce bottles a day when he works, less when he is not working. Other wise he drinks 1 12 ounce sports drink.     Current cardiac meds:  Lisinopril-HCTZ 20-12.5 mg daily (started 2-3 months ago by PCP)  Nadolol 20 mg daily  Lipitor 20 mg daily    Advised pt to increase fluid intake. Pt verbalizes understanding and agreeable to plan.      Please advise

## 2022-06-07 NOTE — TELEPHONE ENCOUNTER
Noted and concur but additionally would reduce lisinopril HCTZ to 0.5 tablets daily.  Would take this medication in the morning and his nadolol at bedtime.  He will need to update us in the next 2-3 weeks.    Thanks!

## 2022-06-08 RX ORDER — NADOLOL 20 MG/1
20 TABLET ORAL DAILY
Qty: 90 TABLET | Refills: 3 | Status: SHIPPED | OUTPATIENT
Start: 2022-06-08 | End: 2023-02-24 | Stop reason: SDUPTHER

## 2023-02-02 NOTE — PROGRESS NOTES
"    Subjective:     Encounter Date:02/24/2023      Patient ID: Yoni Conway is a 57 y.o.  white male, , from [place], [place].      PHYSICIAN: Cleopatra Sanchez MD     Chief Complaint:   Chief Complaint   Patient presents with   • Palpitations   • Mitral Valve Prolapse   • Dyslipidemia     Problem List:  1. Mitral valve prolapse syndrome:  a. Pectus excavatum with straight back syndrome.   b. Apical systolic murmur with holosystolic echocardiographic mitral valve prolapse.   c. Incomplete right bundle branch block.   d. Remote noncardiac atypical chest pain, resolved.   e. Chronic fatigue/dyspnea.   f. Acceptable echocardiographic left ventricular function, 2003.   g. Remote intermittent palpitations with overall acceptable 24 hour Holter monitor, October 2003, with acceptable event recorder, autumn 2003.   h. Abnormal acceptable combination Doppler echocardiogram with residual CCS Class I angina pectoris/CHF, August 2006.   i. Abnormal combination Doppler echocardiogram for suspected endocarditis ([facility] [place] [facility]), August 2009.   j. Residual class I symptoms with abnormal stable combination Doppler echocardiogram/EKG, February 2011, with stable echocardiogram, November 2012.   k. Residual class I symptoms, November 2020.  l. Acceptable negative echocardiographic exercise stress test without anginal type chest discomfort, ischemic electrocardiographic changes, or regional wall motion abnormality with preserved systolic left ventricular function following exercise to 95% predicted exercise capacity and 90% predicted maximum heart rate January 2021.  m.  Residual class I symptoms, February 2022, February 2023  2. Mild-moderate situational depression.   3. Chronic fatigue syndrome -- (?) etiology.   4. Remote apparent otitis media/\"blood infection\", 1979.   5. Remote anal fistula resection/repair, 2002.   6. Atherogenic dyslipidemia; ASCVD 10 year risk is 5.5%, 2.4% if treated.   7. 5-day " hospitalization at psychiatric hospital for major depression December 2019  8. COVID-19 July 2020  9. Intermittent palpitations, November 2020, with acceptable event monitor, December 2020  10. Breakthrough COVID infection, January 2022, with mild symptoms of sinus congestion.  11. Recent diagnosis of hypertension with initiation of antihypertensive therapy, January 2022  12.  Prediabetes; hemoglobin A1c 6.1% August 2022    No Known Allergies    Current Outpatient Medications   Medication Instructions   • atorvastatin (LIPITOR) 20 mg, Oral, Daily   • FLUoxetine (PROZAC) 40 mg, Oral, Daily   • lisinopril (PRINIVIL,ZESTRIL) 10 mg, Oral, Daily   • mirtazapine (REMERON) 30 MG tablet TAKE 1 TABLET BY MOUTH AT BEDTIME FOR 30 DAYS   • nadolol (CORGARD) 20 mg, Oral, Daily, At bedtime   • SV Melatonin 5 mg, Oral, Nightly PRN         HISTORY OF PRESENT ILLNESS:  The patient is here for a 1 year follow-up.  In the interim he called our office in June 2022 reporting that he felt dizzy if he bends over or squats down and felt that it started when he began lisinopril/HCTZ.  His dosage was then reduced to 0.5 tablets daily.  He is now just taking lisinopril.  His blood pressures at home have been WNL.  He denies any chest pain, shortness of breath, palpitations, presyncope, or syncope.  He is trying to do some walking for exercise.  On Sundays he goes around door knocking to peoples homes asking them to come to his Christianity.  He is supposed to have laboratory testing next month with his physician and will try to have the results of this sent to me for review.  He is going to try to adjust his diet and limit his ice cream consumption because he was told before that he was prediabetic.  He has not had any hospitalizations, surgeries, or new diagnoses since he was last seen.      ROS   All other systems reviewed and otherwise negative.      ECG 12 Lead    Date/Time: 2/24/2023 11:14 AM  Performed by: Diann Daigle APRN Authorized  "by: iDann Daigle APRN   Rhythm comments: Normal sinus rhythm, T wave abnormality, consider inferior ischemia, abnormal ECG, 60 bpm, QRS 92 ms, QTc 392 ms,  ms, no significant changes from last ECG in November 2020                 Objective:       Vitals:    02/24/23 1046 02/24/23 1101   BP: 130/66 128/70   BP Location: Right arm Right arm   Patient Position: Sitting Standing   Pulse: 60 60   SpO2: 96%    Weight: 83.9 kg (185 lb)    Height: 175.3 cm (69.02\")      Body mass index is 27.31 kg/m².  Last weight February 2022 was 184 pounds  Constitutional:       Appearance: Healthy appearance. Not in distress.   Neck:      Vascular: No JVR. JVD normal.   Pulmonary:      Effort: Pulmonary effort is normal.      Breath sounds: Normal breath sounds. No wheezing. No rhonchi. No rales.   Chest:      Chest wall: Not tender to palpatation.   Cardiovascular:      PMI at left midclavicular line. Normal rate. Regular rhythm. Normal S1. Normal S2.      Murmurs: There is a grade 1/6 high frequency blowing holosystolic murmur at the apex.      No gallop. No click. No rub.   Pulses:     Dorsalis pedis: 1+ bilaterally.     Posterior tibial: 1+ bilaterally.  Edema:     Peripheral edema absent.   Abdominal:      General: Bowel sounds are normal.      Palpations: Abdomen is soft.      Tenderness: There is no abdominal tenderness.   Musculoskeletal: Normal range of motion.         General: No tenderness. Skin:     General: Skin is warm and dry.   Neurological:      General: No focal deficit present.      Mental Status: Alert and oriented to person, place and time.           Lab Review:   8/5/2022 (reviewed per physician letter):  · CBC: Hematocrit 45, hemoglobin 15.4, WBC 6.8 with normal indices, platelet count, and differential  · CMP: Electrolytes normal, creatinine 1.6, BUN 15, glucose 106, normal  · Serum calcium and liver function test  · Lipid panel: Cholesterol 183, triglycerides 185, HDL 33,   · Hemoglobin A1c " 6.1%  · TSH normal  · Vitamin D- 30.5  · Recommendations for rosuvastatin 10 mg daily with a repeat FLP in 3-4 months.      Advance Care Planning   ACP discussion was held with the patient during this visit. Patient does not have an advance directive, declines further assistance.             Assessment:       Overall continued acceptable course with no new interim cardiopulmonary complaints with acceptable functional status. We will defer additional diagnostic or therapeutic intervention from a cardiac perspective at this time.  The patient will try to have his next laboratory studies to be obtained March 2023 sent to me for review.     Diagnosis Plan   1. Mitral valve prolapse syndrome   No MVP noted on stress echocardiogram 2021      2. Atherogenic dyslipidemia   No new labs to review, continue atorvastatin      3. Intermittent palpitations   No recurrent palpitations, continue nadolol             Plan:         1. Patient to continue current medications and close follow up with the above providers.  2. Tentative cardiology follow up in 1 year or patient may return sooner PRN.    Electronically signed by SHARRON Garcia, 02/24/23, 11:17 AM EST.

## 2023-02-24 ENCOUNTER — OFFICE VISIT (OUTPATIENT)
Dept: CARDIOLOGY | Facility: CLINIC | Age: 58
End: 2023-02-24
Payer: COMMERCIAL

## 2023-02-24 VITALS
HEART RATE: 60 BPM | OXYGEN SATURATION: 96 % | SYSTOLIC BLOOD PRESSURE: 128 MMHG | WEIGHT: 185 LBS | HEIGHT: 69 IN | BODY MASS INDEX: 27.4 KG/M2 | DIASTOLIC BLOOD PRESSURE: 70 MMHG

## 2023-02-24 DIAGNOSIS — R00.2 INTERMITTENT PALPITATIONS: ICD-10-CM

## 2023-02-24 DIAGNOSIS — I34.1 MITRAL VALVE PROLAPSE SYNDROME: Primary | ICD-10-CM

## 2023-02-24 DIAGNOSIS — E78.5 ATHEROGENIC DYSLIPIDEMIA: ICD-10-CM

## 2023-02-24 PROCEDURE — 99214 OFFICE O/P EST MOD 30 MIN: CPT | Performed by: NURSE PRACTITIONER

## 2023-02-24 PROCEDURE — 93000 ELECTROCARDIOGRAM COMPLETE: CPT | Performed by: NURSE PRACTITIONER

## 2023-02-24 RX ORDER — NADOLOL 20 MG/1
20 TABLET ORAL DAILY
Qty: 90 TABLET | Refills: 3 | Status: SHIPPED | OUTPATIENT
Start: 2023-02-24

## 2023-02-24 RX ORDER — MIRTAZAPINE 30 MG/1
TABLET, FILM COATED ORAL
COMMUNITY
Start: 2023-02-09

## 2023-06-19 RX ORDER — NADOLOL 20 MG/1
TABLET ORAL
Qty: 90 TABLET | Refills: 3 | Status: SHIPPED | OUTPATIENT
Start: 2023-06-19

## 2024-02-21 PROBLEM — R73.03 PREDIABETES: Status: ACTIVE | Noted: 2024-02-21

## 2024-03-22 ENCOUNTER — OFFICE VISIT (OUTPATIENT)
Dept: CARDIOLOGY | Facility: CLINIC | Age: 59
End: 2024-03-22
Payer: COMMERCIAL

## 2024-03-22 VITALS
SYSTOLIC BLOOD PRESSURE: 118 MMHG | HEART RATE: 51 BPM | BODY MASS INDEX: 25.83 KG/M2 | OXYGEN SATURATION: 99 % | WEIGHT: 174.4 LBS | DIASTOLIC BLOOD PRESSURE: 64 MMHG | HEIGHT: 69 IN

## 2024-03-22 DIAGNOSIS — R07.89 CHEST PAIN, ATYPICAL: ICD-10-CM

## 2024-03-22 DIAGNOSIS — E78.5 ATHEROGENIC DYSLIPIDEMIA: ICD-10-CM

## 2024-03-22 DIAGNOSIS — R00.2 INTERMITTENT PALPITATIONS: Primary | ICD-10-CM

## 2024-03-22 DIAGNOSIS — R73.03 PREDIABETES: ICD-10-CM

## 2024-03-22 PROCEDURE — 99214 OFFICE O/P EST MOD 30 MIN: CPT | Performed by: NURSE PRACTITIONER

## 2024-03-22 PROCEDURE — 93000 ELECTROCARDIOGRAM COMPLETE: CPT | Performed by: NURSE PRACTITIONER

## 2024-03-22 RX ORDER — LISINOPRIL AND HYDROCHLOROTHIAZIDE 12.5; 1 MG/1; MG/1
1 TABLET ORAL DAILY
COMMUNITY
Start: 2024-02-17

## 2024-03-22 NOTE — PROGRESS NOTES
"    Subjective:     Encounter Date:03/20/2024      Patient ID: Yoni Conway is a 58 y.o.  white male, , from [place], [place].      PHYSICIAN: Cleopatra Sanchez MD.    Chief Complaint:   Chief Complaint   Patient presents with    Mitral valve prolapse syndrome     Problem List:  Mitral valve prolapse syndrome:  Pectus excavatum with straight back syndrome.   Apical systolic murmur with holosystolic echocardiographic mitral valve prolapse.   Incomplete right bundle branch block.   Remote noncardiac atypical chest pain, resolved.   Chronic fatigue/dyspnea.   Acceptable echocardiographic left ventricular function, 2003.   Remote intermittent palpitations with overall acceptable 24 hour Holter monitor, October 2003, with acceptable event recorder, autumn 2003.   Abnormal acceptable combination Doppler echocardiogram with residual CCS Class I angina pectoris/CHF, August 2006.   Abnormal combination Doppler echocardiogram for suspected endocarditis ([facility]), August 2009.   Residual class I symptoms with abnormal stable combination Doppler echocardiogram/EKG, February 2011, with stable echocardiogram, November 2012.   Residual class I symptoms, November 2020.  Acceptable negative echocardiographic exercise stress test without anginal type chest discomfort, ischemic electrocardiographic changes, or regional wall motion abnormality with preserved systolic left ventricular function following exercise to 95% predicted exercise capacity and 90% predicted maximum heart rate January 2021.   Residual class I symptoms, February 2022, February 2023, CCS class I chest discomfort/NYHA class I symptoms March 2024  Mild-moderate situational depression.   Chronic fatigue syndrome -- (?) etiology.   Remote apparent otitis media/\"blood infection\", 1979.   Remote anal fistula resection/repair, 2002.   Atherogenic dyslipidemia; ASCVD 10 year risk is 5.5%, 2.4% if treated.   5-day hospitalization at [facility]" hospital for major depression December 2019  COVID-19 July 2020  Intermittent palpitations, November 2020, with acceptable event monitor, December 2020  10. Breakthrough COVID infection, January 2022, with mild symptoms of sinus congestion.  11. Recent diagnosis of hypertension with initiation of antihypertensive therapy, January 2022  12.  Prediabetes; hemoglobin A1c 6.1% August 2022, subjectively around 5.7% January 2024  13.  Precordial chest pain with normal ECG March 2024    No Known Allergies    Current Outpatient Medications   Medication Instructions    atorvastatin (LIPITOR) 20 mg, Oral, Daily    FLUoxetine (PROZAC) 40 mg, Oral, Daily    lisinopril-hydrochlorothiazide (PRINZIDE,ZESTORETIC) 10-12.5 MG per tablet 1 tablet, Oral, Daily    mirtazapine (REMERON) 30 MG tablet TAKE 1 TABLET BY MOUTH AT BEDTIME FOR 30 DAYS    nadolol (CORGARD) 20 MG tablet TAKE 1 TABLET BY MOUTH ONCE DAILY AT BEDTIME    SV Melatonin 5 mg, Oral, Nightly PRN         HISTORY OF PRESENT ILLNESS:  The patient is here for a 1 year follow-up.  Patient does not often check his blood pressure and heart rate at home but will start doing so.  The patient has been actively trying to change his diet and has lost some weight.  His A1c had gotten up to 6.4% but he said on his last labs it was down to around 5.7% with his weight loss.  He is going to try to increase his physical activity as the weather permits.  He does some walking now and he enjoys fishing with his dad every weekend but the weather is good.  He has noticed over the past week that he has had some dull substernal chest pain but it is rather short-lived.  He says that he has no nausea, vomiting, diaphoresis, shortness of breath, or palpitations.  The chest pain also does not radiate.  The chest pain happens at rest or with activity.  He had an episode once over the past year where he was doing some very heavy lifting and got a little lightheaded for a second.  He will try to have  "his last labs sent to me for review.  Otherwise he has not had any hospitalizations, surgeries, or new diagnoses since he was last seen.      ROS   All other systems reviewed and otherwise negative.      ECG 12 Lead    Date/Time: 3/22/2024 9:53 AM  Performed by: Diann Daigle APRN    Authorized by: Diann Daigle APRN  Rhythm comments: Sinus bradycardia, otherwise normal ECG, 51 bpm,  ms, QRS 90 ms, QTc 363 ms, T wave abnormality no longer present compared to ECG February 2023             Objective:       Vitals:    03/22/24 0916 03/22/24 0918   BP: 120/68 118/64   BP Location: Right arm Right arm   Patient Position: Sitting Standing   Cuff Size: Adult Adult   Pulse: 51 51   SpO2: 99% 99%   Weight: 79.1 kg (174 lb 6.4 oz)    Height: 175.3 cm (69\")      Body mass index is 25.75 kg/m².  Wt Readings from Last 2 Encounters:   03/22/24 79.1 kg (174 lb 6.4 oz)   02/24/23 83.9 kg (185 lb)        Constitutional:       Appearance: Healthy appearance. Not in distress.   Neck:      Vascular: No JVR. JVD normal.   Pulmonary:      Effort: Pulmonary effort is normal.      Breath sounds: Normal breath sounds. No wheezing. No rhonchi. No rales.   Chest:      Chest wall: Not tender to palpatation.   Cardiovascular:      PMI at left midclavicular line. Normal rate. Regular rhythm. Normal S1. Normal S2.       Murmurs: There is a high frequency blowing holosystolic murmur at the apex.      No gallop.  No click. No rub.   Pulses:     Intact distal pulses.   Edema:     Peripheral edema absent.   Abdominal:      General: Bowel sounds are normal.      Palpations: Abdomen is soft.      Tenderness: There is no abdominal tenderness.   Musculoskeletal: Normal range of motion.         General: No tenderness. Skin:     General: Skin is warm and dry.   Neurological:      General: No focal deficit present.      Mental Status: Alert and oriented to person, place and time.           Lab Review: No new labs to review            Results " for orders placed during the hospital encounter of 01/18/21    Adult Stress Echo W/ Cont or Stress Agent if Necessary Per Protocol    Interpretation Summary  · Patient denied chest discomfort/pain during exercise; did report some dyspnea with WNL room air oximetry before, during, and after exercise (95-99%).  · Expected exercise duration = 10:00 minutes; actual exercise duration = 9:18 minutes; DAVDI (+5).  · THR of 141/minute achieved at 8:04 minutes.  · No significant ST changes identified with occasional isolated ventricular ectopy present.  · Estimated left ventricular EF = 65% Left ventricular ejection fraction appears to be 61 - 65%. Left ventricular systolic function is normal.  · Left ventricular diastolic function was normal.  · Estimated right ventricular systolic pressure from tricuspid regurgitation is normal (<35 mmHg).  · Normal right ventricular cavity size, wall thickness, systolic function and septal motion noted.  · No evidence of pulmonary hypertension is present.  · There is no evidence of pericardial effusion.  · No significant structural or functional valvular abnormalities demonstrated; there are no definitive findings to suggest mitral valve prolapse or mitral annular disjunction.  · Acceptable negative echocardiographic exercise stress test without anginal type chest discomfort, ischemic electrocardiographic changes, or regional wall motion abnormality with preserved systolic left ventricular function following exercise to 95% predicted exercise capacity and 90% predicted maximum heart rate.  · Overall, current study suggests low probability for significant focal obstructive coronary artery disease.            Advance Care Planning   ACP discussion was held with the patient during this visit. Patient does not have an advance directive, declines further assistance.      Assessment:    Patient with chest pain over the past week that is brief and likely atypical.  He has not had a recent ischemia  evaluation so I will screen him with a stress test and a CT cardiac calcium score.  Hopefully I will get to review the patient's next laboratory testing results.        Diagnosis Plan   1. Intermittent palpitations  No recurrence, continue nadolol      2. Atherogenic dyslipidemia  CT cardiac calcium score      3. Prediabetes  Increase physical activity, continue heart healthy diet   4.  Chest pain atypical: Stress test, CT cardiac calcium score            Plan:         Patient to continue current medications and close follow up with the above providers.  Tentative cardiology follow up in 1 year or patient may return sooner PRN.  Stress test  CT cardiac calcium score  Patient will start monitoring blood pressure and heart rate more frequently at home      Electronically signed by SHARRON Hurst, 03/22/24, 9:55 AM EDT.

## 2024-05-17 ENCOUNTER — HOSPITAL ENCOUNTER (OUTPATIENT)
Dept: CARDIOLOGY | Facility: HOSPITAL | Age: 59
Discharge: HOME | End: 2024-05-17
Payer: COMMERCIAL

## 2024-05-17 ENCOUNTER — HOSPITAL ENCOUNTER (OUTPATIENT)
Dept: CT IMAGING | Facility: HOSPITAL | Age: 59
Discharge: HOME | End: 2024-05-17
Admitting: NURSE PRACTITIONER

## 2024-05-17 VITALS — WEIGHT: 174 LBS | BODY MASS INDEX: 25.77 KG/M2 | HEIGHT: 69 IN

## 2024-05-17 DIAGNOSIS — E78.5 ATHEROGENIC DYSLIPIDEMIA: ICD-10-CM

## 2024-05-17 DIAGNOSIS — R07.89 CHEST PAIN, ATYPICAL: ICD-10-CM

## 2024-05-17 LAB
BH CV REST NUCLEAR ISOTOPE DOSE: 9.8 MCI
BH CV STRESS BP STAGE 2: NORMAL
BH CV STRESS BP STAGE 3: NORMAL
BH CV STRESS COMMENTS STAGE 1: NORMAL
BH CV STRESS DOSE REGADENOSON STAGE 1: 0.4
BH CV STRESS DURATION MIN STAGE 1: 1
BH CV STRESS DURATION MIN STAGE 2: 1
BH CV STRESS DURATION MIN STAGE 3: 1
BH CV STRESS DURATION MIN STAGE 4: 1
BH CV STRESS DURATION SEC STAGE 1: 10
BH CV STRESS DURATION SEC STAGE 2: 0
BH CV STRESS DURATION SEC STAGE 3: 0
BH CV STRESS DURATION SEC STAGE 4: 0
BH CV STRESS HR STAGE 1: 55
BH CV STRESS HR STAGE 2: 80
BH CV STRESS HR STAGE 3: 77
BH CV STRESS HR STAGE 4: 78
BH CV STRESS NUCLEAR ISOTOPE DOSE: 33 MCI
BH CV STRESS O2 STAGE 1: 100
BH CV STRESS O2 STAGE 2: 100
BH CV STRESS O2 STAGE 3: 100
BH CV STRESS O2 STAGE 4: 100
BH CV STRESS PROTOCOL 1: NORMAL
BH CV STRESS RECOVERY BP: NORMAL MMHG
BH CV STRESS RECOVERY HR: 71 BPM
BH CV STRESS RECOVERY O2: 100 %
BH CV STRESS STAGE 1: 1
BH CV STRESS STAGE 2: 2
BH CV STRESS STAGE 3: 3
BH CV STRESS STAGE 4: 4
LV EF NUC BP: 78 %
MAXIMAL PREDICTED HEART RATE: 164 BPM
PERCENT MAX PREDICTED HR: 48.78 %
STRESS BASELINE BP: NORMAL MMHG
STRESS BASELINE HR: 53 BPM
STRESS O2 SAT REST: 100 %
STRESS PERCENT HR: 57 %
STRESS POST ESTIMATED WORKLOAD: 1 METS
STRESS POST EXERCISE DUR MIN: 4 MIN
STRESS POST EXERCISE DUR SEC: 0 SEC
STRESS POST O2 SAT PEAK: 100 %
STRESS POST PEAK BP: NORMAL MMHG
STRESS POST PEAK HR: 80 BPM
STRESS TARGET HR: 139 BPM

## 2024-05-17 PROCEDURE — 93017 CV STRESS TEST TRACING ONLY: CPT

## 2024-05-17 PROCEDURE — 78452 HT MUSCLE IMAGE SPECT MULT: CPT

## 2024-05-17 PROCEDURE — 75571 CT HRT W/O DYE W/CA TEST: CPT

## 2024-05-17 PROCEDURE — 25010000002 REGADENOSON 0.4 MG/5ML SOLUTION: Performed by: NURSE PRACTITIONER

## 2024-05-17 PROCEDURE — 0 TECHNETIUM SESTAMIBI: Performed by: NURSE PRACTITIONER

## 2024-05-17 PROCEDURE — A9500 TC99M SESTAMIBI: HCPCS | Performed by: NURSE PRACTITIONER

## 2024-05-17 RX ORDER — REGADENOSON 0.08 MG/ML
0.4 INJECTION, SOLUTION INTRAVENOUS ONCE
Status: COMPLETED | OUTPATIENT
Start: 2024-05-17 | End: 2024-05-17

## 2024-05-17 RX ADMIN — TECHNETIUM TC 99M SESTAMIBI 1 DOSE: 1 INJECTION INTRAVENOUS at 11:35

## 2024-05-17 RX ADMIN — REGADENOSON 0.4 MG: 0.08 INJECTION, SOLUTION INTRAVENOUS at 11:35

## 2024-05-17 RX ADMIN — TECHNETIUM TC 99M SESTAMIBI 1 DOSE: 1 INJECTION INTRAVENOUS at 09:50

## 2024-05-18 ENCOUNTER — DOCUMENTATION (OUTPATIENT)
Dept: CARDIOLOGY | Facility: CLINIC | Age: 59
End: 2024-05-18
Payer: COMMERCIAL

## 2024-05-18 NOTE — PROGRESS NOTES
I was able to review the patient's stress test results.  It was a normal myocardial perfusion study no evidence of ischemia.  He had no significant coronary calcium on the CT images.  Would continue current cardiac medications.  CT cardiac calcium score was 0.  I will have MARY Lu call patient results/recommendations.    Electronically signed by SHARRON Hurst, 05/18/24, 10:02 AM EDT.

## 2024-05-20 ENCOUNTER — TELEPHONE (OUTPATIENT)
Dept: CARDIOLOGY | Facility: CLINIC | Age: 59
End: 2024-05-20
Payer: COMMERCIAL

## 2024-05-20 NOTE — TELEPHONE ENCOUNTER
Called pt with results of recent stress test per SHARRON Valadez    I was able to review the patient's stress test results.  It was a normal myocardial perfusion study no evidence of ischemia.  He had no significant coronary calcium on the CT images.  Would continue current cardiac medications.  CT cardiac calcium score was 0.     PT was agreeable and verbalized understanding

## 2025-03-22 NOTE — PROGRESS NOTES
"    Subjective:     Encounter Date:03/28/2025      Patient ID: Yoni Conway is a 59 y.o.  white male, , from [place], [place].      PHYSICIAN: Cleopatra Sanchez MD.    Chief Complaint:   Chief Complaint   Patient presents with    Intermittent palpitations     Problem List:  Mitral valve prolapse syndrome:  Pectus excavatum with straight back syndrome.   Apical systolic murmur with holosystolic echocardiographic mitral valve prolapse.   Incomplete right bundle branch block.   Remote noncardiac atypical chest pain, resolved.   Chronic fatigue/dyspnea.   Acceptable echocardiographic left ventricular function, 2003.   Remote intermittent palpitations with overall acceptable 24 hour Holter monitor, October 2003, with acceptable event recorder, autumn 2003.   Abnormal acceptable combination Doppler echocardiogram with residual CCS Class I angina pectoris/CHF, August 2006.   Abnormal combination Doppler echocardiogram for suspected endocarditis ([facility]), August 2009.   Residual class I symptoms with abnormal stable combination Doppler echocardiogram/EKG, February 2011, with stable echocardiogram, November 2012.   Residual class I symptoms, November 2020.  Acceptable negative echocardiographic exercise stress test without anginal type chest discomfort, ischemic electrocardiographic changes, or regional wall motion abnormality with preserved systolic left ventricular function following exercise to 95% predicted exercise capacity and 90% predicted maximum heart rate January 2021.   Residual class I symptoms, February 2022, February 2023, CCS class I chest discomfort/NYHA class I symptoms March 2024, March 2025  Mild-moderate situational depression.   Chronic fatigue syndrome -- (?) etiology.   Remote apparent otitis media/\"blood infection\", 1979.   Remote anal fistula resection/repair, 2002.   Atherogenic dyslipidemia; ASCVD 10 year risk is 5.5%, 2.4% if treated.   5-day hospitalization at [facility]" hospital for major depression December 2019  COVID-19 July 2020  Intermittent palpitations, November 2020, with acceptable event monitor, December 2020  10. Breakthrough COVID infection, January 2022, with mild symptoms of sinus congestion.  11. Recent diagnosis of hypertension with initiation of antihypertensive therapy, January 2022  12.  Prediabetes; hemoglobin A1c 6.1% August 2022, subjectively around 5.7% January 2024, subjectively 6.3% January 2025  13.  Precordial chest pain with normal ECG March 2024, CT cardiac calcium score 0 May 2024, stress test May 2024 normal myocardial perfusion study with no evidence of ischemia    No Known Allergies    Current Outpatient Medications   Medication Instructions    atorvastatin (LIPITOR) 20 mg, Daily    FLUoxetine (PROZAC) 40 mg, Daily    lisinopril-hydrochlorothiazide (PRINZIDE,ZESTORETIC) 10-12.5 MG per tablet 1 tablet, Daily    LORazepam (ATIVAN) 0.5 MG tablet 1 tablet, Daily    mirtazapine (REMERON) 30 MG tablet TAKE 1 TABLET BY MOUTH AT BEDTIME FOR 30 DAYS    nadolol (CORGARD) 20 MG tablet TAKE 1 TABLET BY MOUTH ONCE DAILY AT BEDTIME    SV Melatonin 5 mg, Nightly PRN         HISTORY OF PRESENT ILLNESS:  The patient is here for a 1 year follow-up.  CT cardiac calcium score was 0. Stress test May 2024 normal myocardial perfusion study with no evidence of ischemia.  He has not had any hospitalizations, surgeries, or new diagnoses since he was last seen.  He denies any chest pain, shortness of breath, palpitations, dizziness, presyncope, or syncope.  He is trying to increase his physical activity and plans to do more fishing and hunting this year.  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  When he had labs a couple months ago he was told that he is prediabetic and his A1c was 6.3%.  He has cut out soda and lost some weight since that time.  He is drinking a lot more water.  He does not often check his blood pressure and heart rate  "at home but will start doing so.      ROS   All other systems reviewed and otherwise negative.      ECG 12 Lead    Date/Time: 3/28/2025 8:52 AM  Performed by: Diann Daigle APRN    Authorized by: Diann Daigle APRN  Rhythm comments: Sinus bradycardia, otherwise normal ECG, 52 bpm, QRS 84 ms, QTc 385 ms,  ms, no change from last ECG March 2024             Objective:       Vitals:    03/28/25 0831 03/28/25 0832   BP: 106/74 116/72   BP Location: Right arm Right arm   Patient Position: Sitting Standing   Pulse:  52   SpO2: 93% 98%   Weight: 80.6 kg (177 lb 9.6 oz)    Height: 175.3 cm (69\")      Body mass index is 26.23 kg/m².  Wt Readings from Last 2 Encounters:   03/28/25 80.6 kg (177 lb 9.6 oz)   05/17/24 78.9 kg (174 lb)        Constitutional:       Appearance: Healthy appearance. Not in distress.   Neck:      Vascular: No JVR. JVD normal.   Pulmonary:      Effort: Pulmonary effort is normal.      Breath sounds: Normal breath sounds. No wheezing. No rhonchi. No rales.   Chest:      Chest wall: Not tender to palpatation.   Cardiovascular:      PMI at left midclavicular line. Normal rate. Regular rhythm. Normal S1. Normal S2.       Murmurs: There is a high frequency blowing holosystolic murmur at the LLSB and apex.      No gallop.  No click. No rub.   Pulses:     Intact distal pulses.   Edema:     Peripheral edema absent.   Abdominal:      General: Bowel sounds are normal.      Palpations: Abdomen is soft.      Tenderness: There is no abdominal tenderness.   Musculoskeletal: Normal range of motion.         General: No tenderness. Skin:     General: Skin is warm and dry.   Neurological:      General: No focal deficit present.      Mental Status: Alert and oriented to person, place and time.           Lab Review: No new labs to review          Results for orders placed during the hospital encounter of 01/18/21    Adult Stress Echo W/ Cont or Stress Agent if Necessary Per Protocol    Interpretation " Summary  · Patient denied chest discomfort/pain during exercise; did report some dyspnea with WNL room air oximetry before, during, and after exercise (95-99%).  · Expected exercise duration = 10:00 minutes; actual exercise duration = 9:18 minutes; DAVID (+5).  · THR of 141/minute achieved at 8:04 minutes.  · No significant ST changes identified with occasional isolated ventricular ectopy present.  · Estimated left ventricular EF = 65% Left ventricular ejection fraction appears to be 61 - 65%. Left ventricular systolic function is normal.  · Left ventricular diastolic function was normal.  · Estimated right ventricular systolic pressure from tricuspid regurgitation is normal (<35 mmHg).  · Normal right ventricular cavity size, wall thickness, systolic function and septal motion noted.  · No evidence of pulmonary hypertension is present.  · There is no evidence of pericardial effusion.  · No significant structural or functional valvular abnormalities demonstrated; there are no definitive findings to suggest mitral valve prolapse or mitral annular disjunction.  · Acceptable negative echocardiographic exercise stress test without anginal type chest discomfort, ischemic electrocardiographic changes, or regional wall motion abnormality with preserved systolic left ventricular function following exercise to 95% predicted exercise capacity and 90% predicted maximum heart rate.  · Overall, current study suggests low probability for significant focal obstructive coronary artery disease.            Advance Care Planning   ACP discussion was held with the patient during this visit. Patient does not have an advance directive, declines further assistance.      Assessment:     Overall continued acceptable course with no new interim cardiopulmonary complaints with good functional status. We will defer additional diagnostic or therapeutic intervention from a cardiac perspective at this time. CT cardiac calcium score was 0. Stress  test May 2024 normal myocardial perfusion study with no evidence of ischemia. Hopefully I will get to review the patient's next laboratory testing results.       Diagnosis Plan   1. Mitral valve prolapse syndrome  No recurrent angina pectoris or CHF on current activity schedule; continue current treatment       2. Intermittent palpitations  Stable, continue nadolol      3. Atherogenic dyslipidemia  No new labs to review, continue atorvastatin      4. Chest pain, atypical  No recurrent angina pectoris or CHF on current activity schedule; continue current treatment              Plan:         Patient to continue current medications and close follow up with the above providers.  Tentative cardiology follow up in 1 year or patient may return sooner PRN.  Consideration of echocardiogram in 2026      Electronically signed by SHARRON Hurst, 03/28/25, 8:53 AM EDT.

## 2025-03-28 ENCOUNTER — OFFICE VISIT (OUTPATIENT)
Dept: CARDIOLOGY | Facility: CLINIC | Age: 60
End: 2025-03-28
Payer: COMMERCIAL

## 2025-03-28 VITALS
SYSTOLIC BLOOD PRESSURE: 116 MMHG | HEART RATE: 52 BPM | DIASTOLIC BLOOD PRESSURE: 72 MMHG | HEIGHT: 69 IN | BODY MASS INDEX: 26.31 KG/M2 | WEIGHT: 177.6 LBS | OXYGEN SATURATION: 98 %

## 2025-03-28 DIAGNOSIS — R00.2 INTERMITTENT PALPITATIONS: ICD-10-CM

## 2025-03-28 DIAGNOSIS — R07.89 CHEST PAIN, ATYPICAL: ICD-10-CM

## 2025-03-28 DIAGNOSIS — I34.1 MITRAL VALVE PROLAPSE SYNDROME: Primary | ICD-10-CM

## 2025-03-28 DIAGNOSIS — E78.5 ATHEROGENIC DYSLIPIDEMIA: ICD-10-CM

## 2025-03-28 PROCEDURE — 99214 OFFICE O/P EST MOD 30 MIN: CPT | Performed by: NURSE PRACTITIONER

## 2025-03-28 PROCEDURE — 93000 ELECTROCARDIOGRAM COMPLETE: CPT | Performed by: NURSE PRACTITIONER

## 2025-03-28 RX ORDER — LORAZEPAM 0.5 MG/1
1 TABLET ORAL DAILY
COMMUNITY
Start: 2025-02-14

## 2025-08-07 ENCOUNTER — DOCUMENTATION (OUTPATIENT)
Dept: CARDIOLOGY | Facility: CLINIC | Age: 60
End: 2025-08-07

## 2025-08-14 ENCOUNTER — TELEPHONE (OUTPATIENT)
Dept: CARDIOLOGY | Facility: CLINIC | Age: 60
End: 2025-08-14